# Patient Record
Sex: FEMALE | Race: WHITE | NOT HISPANIC OR LATINO | Employment: OTHER | URBAN - METROPOLITAN AREA
[De-identification: names, ages, dates, MRNs, and addresses within clinical notes are randomized per-mention and may not be internally consistent; named-entity substitution may affect disease eponyms.]

---

## 2023-02-13 ENCOUNTER — TELEPHONE (OUTPATIENT)
Dept: PRIMARY CARE CLINIC | Facility: CLINIC | Age: 78
End: 2023-02-13
Payer: MEDICARE

## 2023-02-13 NOTE — TELEPHONE ENCOUNTER
Pt was contacted concerning her appt on 2/24/2023 w/Dr Adair; appt cancelled due to change in Dr Adair's schedule.    Left message for pt to return call to this office.

## 2023-02-24 ENCOUNTER — OFFICE VISIT (OUTPATIENT)
Dept: PRIMARY CARE CLINIC | Facility: CLINIC | Age: 78
End: 2023-02-24
Payer: MEDICARE

## 2023-02-24 VITALS
TEMPERATURE: 98 F | SYSTOLIC BLOOD PRESSURE: 136 MMHG | WEIGHT: 197.31 LBS | HEIGHT: 61 IN | BODY MASS INDEX: 37.25 KG/M2 | DIASTOLIC BLOOD PRESSURE: 82 MMHG | HEART RATE: 72 BPM | OXYGEN SATURATION: 96 % | RESPIRATION RATE: 18 BRPM

## 2023-02-24 DIAGNOSIS — E78.5 HYPERLIPIDEMIA, UNSPECIFIED HYPERLIPIDEMIA TYPE: ICD-10-CM

## 2023-02-24 DIAGNOSIS — R73.03 PRE-DIABETES: ICD-10-CM

## 2023-02-24 DIAGNOSIS — I10 HYPERTENSION, UNSPECIFIED TYPE: ICD-10-CM

## 2023-02-24 DIAGNOSIS — E03.9 HYPOTHYROIDISM, UNSPECIFIED TYPE: Primary | ICD-10-CM

## 2023-02-24 PROCEDURE — 99999 PR PBB SHADOW E&M-EST. PATIENT-LVL IV: CPT | Mod: PBBFAC,,, | Performed by: FAMILY MEDICINE

## 2023-02-24 PROCEDURE — 3075F PR MOST RECENT SYSTOLIC BLOOD PRESS GE 130-139MM HG: ICD-10-PCS | Mod: CPTII,S$GLB,, | Performed by: FAMILY MEDICINE

## 2023-02-24 PROCEDURE — 99214 OFFICE O/P EST MOD 30 MIN: CPT | Mod: S$GLB,,, | Performed by: FAMILY MEDICINE

## 2023-02-24 PROCEDURE — 1126F AMNT PAIN NOTED NONE PRSNT: CPT | Mod: CPTII,S$GLB,, | Performed by: FAMILY MEDICINE

## 2023-02-24 PROCEDURE — 3079F PR MOST RECENT DIASTOLIC BLOOD PRESSURE 80-89 MM HG: ICD-10-PCS | Mod: CPTII,S$GLB,, | Performed by: FAMILY MEDICINE

## 2023-02-24 PROCEDURE — 3075F SYST BP GE 130 - 139MM HG: CPT | Mod: CPTII,S$GLB,, | Performed by: FAMILY MEDICINE

## 2023-02-24 PROCEDURE — 3079F DIAST BP 80-89 MM HG: CPT | Mod: CPTII,S$GLB,, | Performed by: FAMILY MEDICINE

## 2023-02-24 PROCEDURE — 99999 PR PBB SHADOW E&M-EST. PATIENT-LVL IV: ICD-10-PCS | Mod: PBBFAC,,, | Performed by: FAMILY MEDICINE

## 2023-02-24 PROCEDURE — 1159F PR MEDICATION LIST DOCUMENTED IN MEDICAL RECORD: ICD-10-PCS | Mod: CPTII,S$GLB,, | Performed by: FAMILY MEDICINE

## 2023-02-24 PROCEDURE — 1126F PR PAIN SEVERITY QUANTIFIED, NO PAIN PRESENT: ICD-10-PCS | Mod: CPTII,S$GLB,, | Performed by: FAMILY MEDICINE

## 2023-02-24 PROCEDURE — 99214 PR OFFICE/OUTPT VISIT, EST, LEVL IV, 30-39 MIN: ICD-10-PCS | Mod: S$GLB,,, | Performed by: FAMILY MEDICINE

## 2023-02-24 PROCEDURE — 1159F MED LIST DOCD IN RCRD: CPT | Mod: CPTII,S$GLB,, | Performed by: FAMILY MEDICINE

## 2023-02-24 RX ORDER — ROSUVASTATIN CALCIUM 10 MG/1
10 TABLET, COATED ORAL NIGHTLY
COMMUNITY
Start: 2022-12-02 | End: 2023-08-24 | Stop reason: SDUPTHER

## 2023-02-24 RX ORDER — METFORMIN HYDROCHLORIDE 500 MG/1
1000 TABLET, EXTENDED RELEASE ORAL 2 TIMES DAILY
COMMUNITY
Start: 2023-01-05 | End: 2023-08-24 | Stop reason: SDUPTHER

## 2023-02-24 RX ORDER — LEVOTHYROXINE SODIUM 125 UG/1
125 TABLET ORAL EVERY MORNING
COMMUNITY
End: 2023-02-24

## 2023-02-24 RX ORDER — ASPIRIN 81 MG/1
81 TABLET ORAL
COMMUNITY

## 2023-02-24 RX ORDER — AA/PROT/LYSINE/METHIO/VIT C/B6 50-12.5 MG
10 TABLET ORAL NIGHTLY
COMMUNITY
End: 2023-02-24 | Stop reason: SDUPTHER

## 2023-02-24 RX ORDER — ERGOCALCIFEROL 1.25 MG/1
50000 CAPSULE ORAL
COMMUNITY

## 2023-02-24 RX ORDER — NITROGLYCERIN 0.4 MG/1
TABLET SUBLINGUAL
COMMUNITY

## 2023-02-24 RX ORDER — LEVOCETIRIZINE DIHYDROCHLORIDE 5 MG/1
5 TABLET, FILM COATED ORAL NIGHTLY
COMMUNITY

## 2023-02-24 RX ORDER — FENOFIBRATE 160 MG/1
160 TABLET ORAL DAILY
COMMUNITY
Start: 2022-12-02 | End: 2023-08-24 | Stop reason: SDUPTHER

## 2023-02-24 RX ORDER — LEVOTHYROXINE SODIUM 125 UG/1
125 TABLET ORAL
Qty: 90 TABLET | Refills: 2 | Status: SHIPPED | OUTPATIENT
Start: 2023-02-24 | End: 2023-08-24

## 2023-02-24 NOTE — PROGRESS NOTES
"        [facility]       [address] [place] [address]      [phone]     Linette Adair MD         .      Office Visit  02/24/2023      Subjective      HPI:  Thelma Gonzalez is a 77 y.o. female presents today in clinic for "Follow-up  ."     Patient is here from [facility]- no complaints but she does complain of steady weight gain but admits she doesn't eat well and hasnt been able to exercise due to foot injury complicated by knee strain. She is doing better today. Started taking metformin since last visit.     REVIEW OF SYMPTOMS  General: weight gain ( 10 lbs)  Psychological ROS: negative ..  Endocrine ROS: difficulty losing weight  Ophthalmic ROS: negative   ENT ROS: negative .  Cardiovascular ROS: negative   Breast ROS: negative   Respiratory ROS: negative   Gastrointestinal ROS: negative   Genito-Urinary ROS: negative   Musculoskeletal ROS: negative   Dermatological ROS: negative  Neurological ROS: negative  Hematological and Lymphatic ROS: denies             The following were updated and reviewed by myself in the chart: medications, past medical history, past surgical history, family history, social history, and allergies.     MEDICATIONS    Current Outpatient Medications:     aspirin (ECOTRIN) 81 MG EC tablet, Take 81 mg by mouth., Disp: , Rfl:     ergocalciferol (ERGOCALCIFEROL) 50,000 unit Cap, Take 50,000 Units by mouth every Mon, Wed, Fri., Disp: , Rfl:     fenofibrate 160 MG Tab, Take 160 mg by mouth once daily., Disp: , Rfl:     levocetirizine (XYZAL) 5 MG tablet, Take 5 mg by mouth every evening., Disp: , Rfl:     metFORMIN (GLUCOPHAGE-XR) 500 MG ER 24hr tablet, Take 1,000 mg by mouth 2 (two) times a day., Disp: , Rfl:     nitroGLYCERIN (NITROSTAT) 0.4 MG SL tablet, nitroglycerin 0.4 mg sublingual tablet, Disp: , Rfl:     NON FORMULARY MEDICATION, kRILL oIL 500MG MORNING Tumeric 1000 mg morning, Disp: , Rfl:     rosuvastatin " "(CRESTOR) 10 MG tablet, Take 10 mg by mouth every evening., Disp: , Rfl:     levothyroxine (SYNTHROID) 125 MCG tablet, Take 1 tablet (125 mcg total) by mouth before breakfast., Disp: 90 tablet, Rfl: 2    ALLERGIES  Review of patient's allergies indicates:   Allergen Reactions    Ciprofloxacin Hives    Macrolide antibiotics Hives    Prednisone Hives          /82   Pulse 72   Temp 97.9 °F (36.6 °C)   Resp 18   Ht 5' 1" (1.549 m)   Wt 89.5 kg (197 lb 5 oz)   SpO2 96%   BMI 37.28 kg/m²   Wt Readings from Last 3 Encounters:   02/24/23 89.5 kg (197 lb 5 oz)     BP Readings from Last 3 Encounters:   02/24/23 136/82          PHYSICAL EXAM:     Physical Exam:    Constitutional:   General Appearance:  healthy and alert  Body Habitus: obese  Level of Distress: NAD   Ambulation: ambulates independently  Psychiatric:   Appearance: well dressed, appropriate  Speech normal tone, pitch and volume  Cognition: memory normal   Orientation: orientated to time, place and person   Mood: normal  Movement: normal and sitting calmly  Head: normocephalic and atraumatic  and puffy facies  Eyes:    Eye Inspection: grossly normal , EOMI  Pupils: PERRLA  Conjunctivae: clear  Lids: normal eyelids  ENMT:     Neck:   Neck: normal, supple with no adenopathy or mass, carotids normal upstroke, no bruits, and increased neck circumference  Thyroid: normal thyroid appearance and examination  Lungs: Respiratory exam normal and unlabored;   Cardiovascular: Cardiovascular exam normal with RRR  Abdomen: soft and non-distended with no tenderness and increased waist circumference   Musculoskeletal: Musculoskeletal exam is normal  for strength, tone and ROM  Extremities: Extremities are normal on exam  Neurologic: Neurological exam is normal and grossly intact  Skin:   Inspection and palpation: Skin exam is normal  Nails: Nails are normal  Hair Texture: thin       ASSESSMENT AND PLAN      1. Hypothyroidism, unspecified type  Patient advised to " comply with thyroid medications as directed. Patient should take thyroid meds on empty stomach and avoid taking with iron, calcium, zinc and fiber.  Potential risks associated with suppressing TSH (increased arrhythmia and bone loss) can occur as a result of thyroid replacement therapy. Emphasis is  on improving patient symptoms. Patient should notify us if any symptoms occur suggestive of overactive thyroid (fast heart rate, anxiety, sleeplessness, and tremors). Routine follow up recommended TSH is suboptimal and patient has had more weight gain.  We will try to increase to 125 to see if this improves symptoms.  We will repeat labs in 6 months  -     levothyroxine (SYNTHROID) 125 MCG tablet; Take 1 tablet (125 mcg total) by mouth before breakfast.  Dispense: 90 tablet; Refill: 2    2. Pre-diabetes  Pathophysiology of insulin resistance discussed with patient and therapeutic options were explained.  Dietary and lifestyle changes are recommended for the treatment of insulin resistance.   Low carbohydrate diet and/or possible intermittent fasting is recommended for insulin resistance and/or prediabetes.  Non FDA approved treatment options include but is not limited to GLP's, SGLT-2's, biguanides, and other glucose support supplements. Patient will notify us with any concerns or complaints regarding treatment plan.Weight loss is strongly encouraged and is emphasized to help reduce risk of diabetes.  Now taking metformin and tolerating well.  Okay to try to increase as tolerated    3. Hyperlipidemia, unspecified hyperlipidemia type  Reviewed importance of advanced lipid profiles. Advise daily exercise. Diet is recommended. Patients are encouraged to obtain healthy BMI weight. Risks associated with high cholesterol are well established and include but are not limited to heart disease, stroke and peripheral vascular disease. Patient should not smoke. Goal LDL particle number is <1000 and ApoB <70. Basic LDL is below 100  or below 70 if diabetic. Some non-FDA approved dietary supplements that may be beneficial to patient include but is not limited to high fiber diet at least 30g daily; niacin ER non flush free variety 500mg-1,000mg; Omega-3 fish oil DHA+EPA = 1,000mg twice daily; baby dose aspirin 81mg; co-enzyme q10 500mg to help reduce statin-induced myalgia; red rice yeast 1200mg twice daily; berberine 1000mg-2000mg daily. Patient is encouraged to exercise routinely and adhere to heart healthy diet with Mediterranean diet showing the most consistent data to help with lipid management.  On statin and controlled.  Sees cardiologist routinely.    4. Hypertension, unspecified type  Patient understands pathophysiology of high blood pressure. Patient should take meds as directed. Patient is asked to monitor blood pressure at home at random and send in BP diary if changes are made to treatment plan so adjustments can be made, if necessary, within 1-2 weeks of stopping, starting or changing medications. Patient will notify us if any symptoms occur including but not limited to dizziness, faint feeling, headache, blurred vision or chest pain or go to nearest ER if necessary for evaluation.  Blood pressure is stable on current regimen.         FOLLOW-UP:  No follow-ups on file.    I spent a total of 38 minutes face to face and non-face to face on the date of this visit.This includes time preparing to see the patient (eg, review of tests, notes), obtaining and/or reviewing additional history from an independent historian and/or outside medical records, documenting clinical information in the electronic health record, independently interpreting results and/or communicating results to the patient/family/caregiver, or care coordinator.    Signed by:  Linette Adair MD

## 2023-03-03 ENCOUNTER — PATIENT MESSAGE (OUTPATIENT)
Dept: PRIMARY CARE CLINIC | Facility: CLINIC | Age: 78
End: 2023-03-03
Payer: MEDICARE

## 2023-03-06 ENCOUNTER — PATIENT MESSAGE (OUTPATIENT)
Dept: PRIMARY CARE CLINIC | Facility: CLINIC | Age: 78
End: 2023-03-06
Payer: MEDICARE

## 2023-03-09 ENCOUNTER — PATIENT MESSAGE (OUTPATIENT)
Dept: PRIMARY CARE CLINIC | Facility: CLINIC | Age: 78
End: 2023-03-09
Payer: MEDICARE

## 2023-03-14 ENCOUNTER — PATIENT MESSAGE (OUTPATIENT)
Dept: PRIMARY CARE CLINIC | Facility: CLINIC | Age: 78
End: 2023-03-14
Payer: MEDICARE

## 2023-03-14 DIAGNOSIS — R73.03 PRE-DIABETES: Primary | ICD-10-CM

## 2023-03-14 RX ORDER — SEMAGLUTIDE 1.34 MG/ML
0.5 INJECTION, SOLUTION SUBCUTANEOUS
Qty: 4.5 ML | Refills: 1 | Status: SHIPPED | OUTPATIENT
Start: 2023-03-14 | End: 2023-06-29 | Stop reason: SDUPTHER

## 2023-03-15 ENCOUNTER — PATIENT MESSAGE (OUTPATIENT)
Dept: PRIMARY CARE CLINIC | Facility: CLINIC | Age: 78
End: 2023-03-15
Payer: MEDICARE

## 2023-03-16 NOTE — PROGRESS NOTES
Patient, Thelma Gonzalez (MRN #[id number]), presented with a recorded BMI of 37.28 kg/m^2 and a documented comorbidity(s):  - Hypertension  - Hyperlipidemia  to which the severe obesity is a contributing factor. This is consistent with the definition of severe obesity (BMI 35.0-39.9) with comorbidity (ICD-10 E66.01, Z68.35). The patient's severe obesity was monitored, evaluated, addressed and/or treated. This addendum to the medical record is made on 03/16/2023.

## 2023-03-24 ENCOUNTER — PATIENT MESSAGE (OUTPATIENT)
Dept: PRIMARY CARE CLINIC | Facility: CLINIC | Age: 78
End: 2023-03-24
Payer: MEDICARE

## 2023-03-30 ENCOUNTER — PATIENT MESSAGE (OUTPATIENT)
Dept: PRIMARY CARE CLINIC | Facility: CLINIC | Age: 78
End: 2023-03-30
Payer: MEDICARE

## 2023-06-29 DIAGNOSIS — E78.5 HYPERLIPIDEMIA, UNSPECIFIED HYPERLIPIDEMIA TYPE: ICD-10-CM

## 2023-06-29 DIAGNOSIS — R73.03 PRE-DIABETES: ICD-10-CM

## 2023-06-29 DIAGNOSIS — E03.9 HYPOTHYROIDISM, UNSPECIFIED TYPE: Primary | ICD-10-CM

## 2023-06-29 DIAGNOSIS — I10 HYPERTENSION, UNSPECIFIED TYPE: ICD-10-CM

## 2023-06-29 RX ORDER — SEMAGLUTIDE 1.34 MG/ML
0.5 INJECTION, SOLUTION SUBCUTANEOUS
Qty: 4.5 ML | Refills: 1 | Status: SHIPPED | OUTPATIENT
Start: 2023-06-29 | End: 2023-08-24

## 2023-08-17 LAB
ERYTHROCYTE [DISTWIDTH] IN BLOOD BY AUTOMATED COUNT: 12.5 % (ref 11–15)
HCT VFR BLD AUTO: 41.3 % (ref 35–45)
HGB BLD-MCNC: 13.5 G/DL (ref 11.7–15.5)
INSULIN SERPL-ACNC: 34.4 UIU/ML
MCH RBC QN AUTO: 30.1 PG (ref 27–33)
MCHC RBC AUTO-ENTMCNC: 32.7 G/DL (ref 32–36)
MCV RBC AUTO: 92 FL (ref 80–100)
PLATELET # BLD AUTO: 274 THOUSAND/UL (ref 140–400)
PMV BLD REES-ECKER: 9.7 FL (ref 7.5–12.5)
RBC # BLD AUTO: 4.49 MILLION/UL (ref 3.8–5.1)
T3FREE SERPL-MCNC: 3.2 PG/ML (ref 2.3–4.2)
WBC # BLD AUTO: 7.1 THOUSAND/UL (ref 3.8–10.8)

## 2023-08-18 LAB
ALBUMIN SERPL-MCNC: 4.7 G/DL (ref 3.6–5.1)
ALBUMIN/GLOB SERPL: 2.1 (CALC) (ref 1–2.5)
ALP SERPL-CCNC: 48 U/L (ref 37–153)
ALT SERPL-CCNC: 25 U/L (ref 6–29)
AST SERPL-CCNC: 23 U/L (ref 10–35)
BILIRUB SERPL-MCNC: 0.6 MG/DL (ref 0.2–1.2)
BUN SERPL-MCNC: 17 MG/DL (ref 7–25)
BUN/CREAT SERPL: NORMAL (CALC) (ref 6–22)
CALCIUM SERPL-MCNC: 10.1 MG/DL (ref 8.6–10.4)
CHLORIDE SERPL-SCNC: 103 MMOL/L (ref 98–110)
CHOLEST SERPL-MCNC: 115 MG/DL
CHOLEST/HDLC SERPL: 3.2 (CALC)
CO2 SERPL-SCNC: 29 MMOL/L (ref 20–32)
CREAT SERPL-MCNC: 0.72 MG/DL (ref 0.6–1)
EGFR: 86 ML/MIN/1.73M2
GLOBULIN SER CALC-MCNC: 2.2 G/DL (CALC) (ref 1.9–3.7)
GLUCOSE SERPL-MCNC: 87 MG/DL (ref 65–99)
HBA1C MFR BLD: 5.2 % OF TOTAL HGB
HDLC SERPL-MCNC: 36 MG/DL
LDLC SERPL CALC-MCNC: 53 MG/DL (CALC)
NONHDLC SERPL-MCNC: 79 MG/DL (CALC)
POTASSIUM SERPL-SCNC: 3.7 MMOL/L (ref 3.5–5.3)
PROT SERPL-MCNC: 6.9 G/DL (ref 6.1–8.1)
SODIUM SERPL-SCNC: 142 MMOL/L (ref 135–146)
T4 FREE SERPL-MCNC: 1.5 NG/DL (ref 0.8–1.8)
THYROPEROXIDASE AB SERPL-ACNC: 1 IU/ML
TRIGL SERPL-MCNC: 180 MG/DL
TSH SERPL-ACNC: 0.21 MIU/L (ref 0.4–4.5)

## 2023-08-24 ENCOUNTER — OFFICE VISIT (OUTPATIENT)
Dept: PRIMARY CARE CLINIC | Facility: CLINIC | Age: 78
End: 2023-08-24
Payer: MEDICARE

## 2023-08-24 VITALS
BODY MASS INDEX: 33.51 KG/M2 | TEMPERATURE: 97 F | SYSTOLIC BLOOD PRESSURE: 138 MMHG | WEIGHT: 177.5 LBS | HEART RATE: 74 BPM | HEIGHT: 61 IN | DIASTOLIC BLOOD PRESSURE: 80 MMHG

## 2023-08-24 DIAGNOSIS — R73.03 PRE-DIABETES: Primary | ICD-10-CM

## 2023-08-24 DIAGNOSIS — E78.5 HYPERLIPIDEMIA, UNSPECIFIED HYPERLIPIDEMIA TYPE: ICD-10-CM

## 2023-08-24 DIAGNOSIS — E03.9 HYPOTHYROIDISM, UNSPECIFIED TYPE: ICD-10-CM

## 2023-08-24 DIAGNOSIS — I10 HYPERTENSION, UNSPECIFIED TYPE: ICD-10-CM

## 2023-08-24 PROCEDURE — 3075F SYST BP GE 130 - 139MM HG: CPT | Mod: CPTII,S$GLB,, | Performed by: FAMILY MEDICINE

## 2023-08-24 PROCEDURE — 99215 OFFICE O/P EST HI 40 MIN: CPT | Mod: S$GLB,,, | Performed by: FAMILY MEDICINE

## 2023-08-24 PROCEDURE — 3079F DIAST BP 80-89 MM HG: CPT | Mod: CPTII,S$GLB,, | Performed by: FAMILY MEDICINE

## 2023-08-24 PROCEDURE — 1126F PR PAIN SEVERITY QUANTIFIED, NO PAIN PRESENT: ICD-10-PCS | Mod: CPTII,S$GLB,, | Performed by: FAMILY MEDICINE

## 2023-08-24 PROCEDURE — 3075F PR MOST RECENT SYSTOLIC BLOOD PRESS GE 130-139MM HG: ICD-10-PCS | Mod: CPTII,S$GLB,, | Performed by: FAMILY MEDICINE

## 2023-08-24 PROCEDURE — 3288F PR FALLS RISK ASSESSMENT DOCUMENTED: ICD-10-PCS | Mod: CPTII,S$GLB,, | Performed by: FAMILY MEDICINE

## 2023-08-24 PROCEDURE — 99999 PR PBB SHADOW E&M-EST. PATIENT-LVL III: CPT | Mod: PBBFAC,,, | Performed by: FAMILY MEDICINE

## 2023-08-24 PROCEDURE — 1126F AMNT PAIN NOTED NONE PRSNT: CPT | Mod: CPTII,S$GLB,, | Performed by: FAMILY MEDICINE

## 2023-08-24 PROCEDURE — 99215 PR OFFICE/OUTPT VISIT, EST, LEVL V, 40-54 MIN: ICD-10-PCS | Mod: S$GLB,,, | Performed by: FAMILY MEDICINE

## 2023-08-24 PROCEDURE — 1101F PR PT FALLS ASSESS DOC 0-1 FALLS W/OUT INJ PAST YR: ICD-10-PCS | Mod: CPTII,S$GLB,, | Performed by: FAMILY MEDICINE

## 2023-08-24 PROCEDURE — 1159F MED LIST DOCD IN RCRD: CPT | Mod: CPTII,S$GLB,, | Performed by: FAMILY MEDICINE

## 2023-08-24 PROCEDURE — 3288F FALL RISK ASSESSMENT DOCD: CPT | Mod: CPTII,S$GLB,, | Performed by: FAMILY MEDICINE

## 2023-08-24 PROCEDURE — 3079F PR MOST RECENT DIASTOLIC BLOOD PRESSURE 80-89 MM HG: ICD-10-PCS | Mod: CPTII,S$GLB,, | Performed by: FAMILY MEDICINE

## 2023-08-24 PROCEDURE — 99999 PR PBB SHADOW E&M-EST. PATIENT-LVL III: ICD-10-PCS | Mod: PBBFAC,,, | Performed by: FAMILY MEDICINE

## 2023-08-24 PROCEDURE — 1101F PT FALLS ASSESS-DOCD LE1/YR: CPT | Mod: CPTII,S$GLB,, | Performed by: FAMILY MEDICINE

## 2023-08-24 PROCEDURE — 1159F PR MEDICATION LIST DOCUMENTED IN MEDICAL RECORD: ICD-10-PCS | Mod: CPTII,S$GLB,, | Performed by: FAMILY MEDICINE

## 2023-08-24 RX ORDER — ROSUVASTATIN CALCIUM 10 MG/1
10 TABLET, COATED ORAL NIGHTLY
Qty: 90 TABLET | Refills: 2 | Status: SHIPPED | OUTPATIENT
Start: 2023-08-24 | End: 2024-04-02 | Stop reason: SDUPTHER

## 2023-08-24 RX ORDER — METFORMIN HYDROCHLORIDE 500 MG/1
1000 TABLET, EXTENDED RELEASE ORAL 2 TIMES DAILY
Qty: 360 TABLET | Refills: 2 | Status: SHIPPED | OUTPATIENT
Start: 2023-08-24 | End: 2024-04-02 | Stop reason: SDUPTHER

## 2023-08-24 RX ORDER — FENOFIBRATE 160 MG/1
160 TABLET ORAL DAILY
Qty: 90 TABLET | Refills: 2 | Status: SHIPPED | OUTPATIENT
Start: 2023-08-24 | End: 2024-04-02 | Stop reason: SDUPTHER

## 2023-08-24 RX ORDER — FUROSEMIDE 20 MG/1
20 TABLET ORAL
COMMUNITY
Start: 2023-03-11

## 2023-08-24 RX ORDER — ICOSAPENT ETHYL 1000 MG/1
2 CAPSULE ORAL 2 TIMES DAILY
Qty: 360 CAPSULE | Refills: 2 | Status: SHIPPED | OUTPATIENT
Start: 2023-08-24 | End: 2024-04-02

## 2023-08-24 RX ORDER — SEMAGLUTIDE 1.34 MG/ML
1 INJECTION, SOLUTION SUBCUTANEOUS
Qty: 9 ML | Refills: 3 | Status: SHIPPED | OUTPATIENT
Start: 2023-08-24 | End: 2023-11-28

## 2023-08-24 RX ORDER — LEVOTHYROXINE SODIUM 112 UG/1
112 TABLET ORAL
Qty: 90 TABLET | Refills: 2 | Status: SHIPPED | OUTPATIENT
Start: 2023-08-24 | End: 2024-04-02 | Stop reason: SDUPTHER

## 2023-08-24 NOTE — ASSESSMENT & PLAN NOTE
Presumed with recent weight loss, TSH is now suppressed though she has no overactive symptoms.  But given her age and risk factors, we will go ahead and reduce Synthroid and then plan to repeat labs in six months or sooner if patient develops symptoms

## 2023-08-24 NOTE — ASSESSMENT & PLAN NOTE
Triglycerides are still elevated despite multiple medications for such and weight loss.  She is had levels as high as 1000 when she was seen [facility].  We will try adding prescription strength fish oil to see if this can help get her closer to the goal of 150 or less but regardless this maybe her normal given her history

## 2023-08-24 NOTE — PROGRESS NOTES
"    [facility]       [address] [place] [address]      [phone]     Linette Adair MD         .      Office Visit  08/24/2023  [id number]      SUBJECTIVE     HPI:  Thelma Gonzalez is a 78 y.o. female presents today in clinic for "Follow-up  ."   Patient is here for routine lab follow-up.  She is doing great since she started Ozempic and has been able to lose 20 lb.  She is staying active in her guard mainly but is limited to the amount of heat outside.  She feels great.  She is in the process of seeing a new cardiologist since hers retired.  She feels like her thyroid is good.  Sleep is good        ROS   Review of symptoms are negative except as noted.     PAST MEDICAL HISTORY     Past Medical History:   Diagnosis Date    Cancer     Hyperlipidemia     Hyperthyroidism        Past Surgical History:   Procedure Laterality Date    BREAST SURGERY      LUMPECTOMY, BREAST         Social History     Socioeconomic History    Marital status:    Tobacco Use    Smoking status: Never    Smokeless tobacco: Never       Allergies as of 08/24/2023 - Reviewed 08/24/2023   Allergen Reaction Noted    Ciprofloxacin Hives 02/24/2023    Nitrofurantoin monohyd/m-cryst Hives 06/26/2023    Prednisone Hives 02/24/2023    Macrolide antibiotics Hives 02/24/2023       HOME MEDS     Current Outpatient Medications   Medication Instructions    aspirin (ECOTRIN) 81 mg, Oral    ergocalciferol (ERGOCALCIFEROL) 50,000 Units, Oral, Every Mon, Wed, Fri    fenofibrate 160 mg, Oral, Daily    furosemide (LASIX) 20 mg, Oral    icosapent ethyL (VASCEPA) 2 g, Oral, 2 times daily    levocetirizine (XYZAL) 5 mg, Oral, Nightly    levothyroxine (SYNTHROID) 112 mcg, Oral, Before breakfast    metFORMIN (GLUCOPHAGE-XR) 1,000 mg, Oral, 2 times daily    nitroGLYCERIN (NITROSTAT) 0.4 MG SL tablet nitroglycerin 0.4 mg sublingual tablet    NON FORMULARY MEDICATION kRILL oIL 500MG " "MORNING<BR>Tumeric 1000 mg morning    OZEMPIC 1 mg, Subcutaneous, Every 7 days    rosuvastatin (CRESTOR) 10 mg, Oral, Nightly       The following were updated and reviewed by myself in the chart: medications, past medical history, past surgical history, family history, social history, and allergies.        Objective    OBJECTIVE   /80   Pulse 74   Temp 97.3 °F (36.3 °C)   Ht 5' 1" (1.549 m)   Wt 80.5 kg (177 lb 7.5 oz)   BMI 33.53 kg/m²     Wt Readings from Last 2 Encounters:   08/24/23 80.5 kg (177 lb 7.5 oz)   02/24/23 89.5 kg (197 lb 5 oz)       BP Readings from Last 2 Encounters:   08/24/23 138/80   02/24/23 136/82          Physical Exam  Vitals and nursing note reviewed.   Constitutional:       Appearance: Normal appearance. She is obese.   HENT:      Head: Normocephalic and atraumatic.      Nose: Nose normal.   Eyes:      Extraocular Movements: Extraocular movements intact.      Conjunctiva/sclera: Conjunctivae normal.      Pupils: Pupils are equal, round, and reactive to light.      Comments: Lateral eyebrow thinning   Cardiovascular:      Rate and Rhythm: Normal rate and regular rhythm.   Pulmonary:      Effort: Pulmonary effort is normal.      Breath sounds: Normal breath sounds.   Abdominal:      General: Abdomen is flat.      Palpations: Abdomen is soft.      Tenderness: There is no abdominal tenderness.      Comments: Increased abdominal girth   Musculoskeletal:         General: No swelling or tenderness. Normal range of motion.      Cervical back: Normal range of motion.   Lymphadenopathy:      Cervical: No cervical adenopathy.   Skin:     General: Skin is warm.      Findings: No lesion or rash.      Comments: Thinning hair   Neurological:      General: No focal deficit present.      Mental Status: She is alert. Mental status is at baseline.   Psychiatric:         Mood and Affect: Mood normal.         Behavior: Behavior normal.               LABS      LAB RESULTS, IF AVAILABLE, ARE DISCUSSED " WITH PATIENT AND POSTED TO PATIENT PORTAL     ASSESSMENT & PLAN     1. Pre-diabetes  Overview:  Pathophysiology of insulin resistance discussed with patient and therapeutic options were explained.  Dietary and lifestyle changes are recommended for the treatment of insulin resistance.   Low carbohydrate diet and/or possible intermittent fasting is recommended for insulin resistance and/or prediabetes.  Non FDA approved treatment options include but is not limited to GLP's, SGLT-2's, biguanides, and other glucose support supplements. Patient will notify us with any concerns or complaints regarding treatment plan.Weight loss is strongly encouraged and is emphasized to help reduce risk of diabetes.      Assessment & Plan:  Patient is only taking one metformin along with Ozempic and finds this to be the best combination.  She is doing well so we will continue    Orders:  -     semaglutide (OZEMPIC) 1 mg/dose (4 mg/3 mL); Inject 1 mg into the skin every 7 days.  Dispense: 9 mL; Refill: 3  -     metFORMIN (GLUCOPHAGE-XR) 500 MG ER 24hr tablet; Take 2 tablets (1,000 mg total) by mouth 2 (two) times a day.  Dispense: 360 tablet; Refill: 2    2. Hypothyroidism, unspecified type  Overview:  Patient advised to comply with thyroid medications as directed. Patient should take thyroid meds on empty stomach and avoid taking with iron, calcium, zinc and fiber.  Potential risks associated with suppressing TSH (increased arrhythmia and bone loss) can occur as a result of thyroid replacement therapy. Emphasis is  on improving patient symptoms. Patient should notify us if any symptoms occur suggestive of overactive thyroid (fast heart rate, anxiety, sleeplessness, and tremors). Routine follow up recommended       Assessment & Plan:  Presumed with recent weight loss, TSH is now suppressed though she has no overactive symptoms.  But given her age and risk factors, we will go ahead and reduce Synthroid and then plan to repeat labs in six  months or sooner if patient develops symptoms    Orders:  -     levothyroxine (SYNTHROID) 112 MCG tablet; Take 1 tablet (112 mcg total) by mouth before breakfast.  Dispense: 90 tablet; Refill: 2    3. Hyperlipidemia, unspecified hyperlipidemia type  Overview:  Reviewed importance of advanced lipid profiles. Advise daily exercise. Diet is recommended. Patients are encouraged to obtain healthy BMI weight. Risks associated with high cholesterol are well established and include but are not limited to heart disease, stroke and peripheral vascular disease. Patient should not smoke. Goal LDL particle number is <1000 and ApoB <70. Basic LDL is below 100 or below 70 if diabetic. Some non-FDA approved dietary supplements that may be beneficial to patient include but is not limited to high fiber diet at least 30g daily; niacin ER non flush free variety 500mg-1,000mg; Omega-3 fish oil DHA+EPA = 1,000mg twice daily; baby dose aspirin 81mg; co-enzyme q10 500mg to help reduce statin-induced myalgia; red rice yeast 1200mg twice daily; berberine 1000mg-2000mg daily. Patient is encouraged to exercise routinely and adhere to heart healthy diet with Mediterranean diet showing the most consistent data to help with lipid management.      Assessment & Plan:  Triglycerides are still elevated despite multiple medications for such and weight loss.  She is had levels as high as 1000 when she was seen by Dr. Shukla at the metabolic center.  We will try adding prescription strength fish oil to see if this can help get her closer to the goal of 150 or less but regardless this maybe her normal given her history    Orders:  -     icosapent ethyL (VASCEPA) 1 gram Cap; Take 2 capsules (2 g total) by mouth 2 (two) times a day.  Dispense: 360 capsule; Refill: 2  -     fenofibrate 160 MG Tab; Take 1 tablet (160 mg total) by mouth once daily.  Dispense: 90 tablet; Refill: 2  -     rosuvastatin (CRESTOR) 10 MG tablet; Take 1 tablet (10 mg total) by mouth  "every evening.  Dispense: 90 tablet; Refill: 2    4. Hypertension, unspecified type  Overview:  Patient understands pathophysiology of high blood pressure. Patient should take meds as directed. Patient is asked to monitor blood pressure at home at random and send in BP diary if changes are made to treatment plan so adjustments can be made, if necessary, within 1-2 weeks of stopping, starting or changing medications. Patient will notify us if any symptoms occur including but not limited to dizziness, faint feeling, headache, blurred vision or chest pain or go to nearest ER if necessary for evaluation.      Assessment & Plan:  Blood pressure is stable.  No changes.            I spent a total of 40 minutes face to face and non-face to face on the date of this visit.This includes time preparing to see the patient (eg, review of tests, notes), obtaining and/or reviewing additional history from an independent historian and/or outside medical records, documenting clinical information in the electronic health record, independently interpreting results and/or communicating results to the patient/family/caregiver, or care coordinator.      Disclaimer: Portions of this record may have been created with voice recognition software. Occasional wrong-word or "sound-a-like" substitutions may have occurred due to inherent limitations of voice recognition software. Read the chart carefully and recognize, using context, where substitutions have occurred."    Signed by:  Linette Adair MD           "

## 2023-08-24 NOTE — ASSESSMENT & PLAN NOTE
Patient is only taking one metformin along with Ozempic and finds this to be the best combination.  She is doing well so we will continue

## 2023-09-05 ENCOUNTER — PATIENT MESSAGE (OUTPATIENT)
Dept: PRIMARY CARE CLINIC | Facility: CLINIC | Age: 78
End: 2023-09-05
Payer: MEDICARE

## 2023-10-11 DIAGNOSIS — E03.9 HYPOTHYROIDISM, UNSPECIFIED TYPE: ICD-10-CM

## 2023-10-11 RX ORDER — LEVOTHYROXINE SODIUM 125 UG/1
TABLET ORAL
Qty: 90 TABLET | Refills: 2 | OUTPATIENT
Start: 2023-10-11

## 2023-11-28 DIAGNOSIS — R73.03 PRE-DIABETES: Primary | ICD-10-CM

## 2023-11-28 RX ORDER — SEMAGLUTIDE 2.68 MG/ML
2 INJECTION, SOLUTION SUBCUTANEOUS
Qty: 9 ML | Refills: 3 | Status: SHIPPED | OUTPATIENT
Start: 2023-11-28 | End: 2024-04-02

## 2024-02-20 DIAGNOSIS — E03.9 HYPOTHYROIDISM, UNSPECIFIED TYPE: ICD-10-CM

## 2024-02-20 RX ORDER — LEVOTHYROXINE SODIUM 125 UG/1
TABLET ORAL
Qty: 90 TABLET | Refills: 2 | Status: SHIPPED | OUTPATIENT
Start: 2024-02-20 | End: 2024-04-02

## 2024-02-28 ENCOUNTER — TELEPHONE (OUTPATIENT)
Dept: PRIMARY CARE CLINIC | Facility: CLINIC | Age: 79
End: 2024-02-28
Payer: MEDICARE

## 2024-02-28 NOTE — TELEPHONE ENCOUNTER
----- Message from Pamela Sherwood sent at 2/28/2024  2:41 PM CST -----  Name of Who is Calling:pt           What is the request in detail:requesting a call to discuss the letter that was received about provider going to the [place] at the end of March. Patient would like to reschedule for the new location but [place] schedule is not available.            Can the clinic reply by [facility]:no           What Number to Call Back if not in [facility]: [phone]

## 2024-02-28 NOTE — TELEPHONE ENCOUNTER
Called pt and let vm, stating provider will be no longer in primary care but we will be reaching out soon to discuss appt in April with details once we get them.

## 2024-03-06 ENCOUNTER — PATIENT MESSAGE (OUTPATIENT)
Dept: PRIMARY CARE CLINIC | Facility: CLINIC | Age: 79
End: 2024-03-06
Payer: MEDICARE

## 2024-03-26 ENCOUNTER — TELEPHONE (OUTPATIENT)
Dept: PRIMARY CARE CLINIC | Facility: CLINIC | Age: 79
End: 2024-03-26
Payer: MEDICARE

## 2024-03-26 ENCOUNTER — PATIENT MESSAGE (OUTPATIENT)
Dept: PRIMARY CARE CLINIC | Facility: CLINIC | Age: 79
End: 2024-03-26
Payer: MEDICARE

## 2024-03-26 NOTE — TELEPHONE ENCOUNTER
----- Message from Chata Acosta sent at 3/26/2024  2:21 PM CDT -----  Contact: self  [phone]  Patient called in this afternoon stating she would like to reschedule her appointment for 04/02 that is scheduled for the [facility] to the [place]. Please call back  [phone]. Thanks tpw

## 2024-03-27 ENCOUNTER — TELEPHONE (OUTPATIENT)
Dept: PRIMARY CARE CLINIC | Facility: CLINIC | Age: 79
End: 2024-03-27
Payer: MEDICARE

## 2024-03-27 DIAGNOSIS — I10 HYPERTENSION, UNSPECIFIED TYPE: ICD-10-CM

## 2024-03-27 DIAGNOSIS — R73.03 PRE-DIABETES: Primary | ICD-10-CM

## 2024-03-27 DIAGNOSIS — E78.5 HYPERLIPIDEMIA, UNSPECIFIED HYPERLIPIDEMIA TYPE: ICD-10-CM

## 2024-03-27 DIAGNOSIS — E03.9 HYPOTHYROIDISM, UNSPECIFIED TYPE: ICD-10-CM

## 2024-03-27 NOTE — TELEPHONE ENCOUNTER
----- Message from Christopher Knox sent at 3/27/2024  9:47 AM CDT -----  Contact: Thelma Gaspar is calling in regards to getting lab orders sent to [facility], fax number [phone]. Please call back at [phone]                                    Thanks  KT

## 2024-04-02 ENCOUNTER — OFFICE VISIT (OUTPATIENT)
Dept: PRIMARY CARE CLINIC | Facility: CLINIC | Age: 79
End: 2024-04-02
Payer: MEDICARE

## 2024-04-02 VITALS
BODY MASS INDEX: 31.3 KG/M2 | OXYGEN SATURATION: 98 % | WEIGHT: 165.81 LBS | HEART RATE: 70 BPM | TEMPERATURE: 98 F | SYSTOLIC BLOOD PRESSURE: 138 MMHG | HEIGHT: 61 IN | DIASTOLIC BLOOD PRESSURE: 82 MMHG

## 2024-04-02 DIAGNOSIS — R73.03 PRE-DIABETES: ICD-10-CM

## 2024-04-02 DIAGNOSIS — C20 ADENOCARCINOMA OF RECTUM: ICD-10-CM

## 2024-04-02 DIAGNOSIS — E03.9 HYPOTHYROIDISM, UNSPECIFIED TYPE: Primary | ICD-10-CM

## 2024-04-02 DIAGNOSIS — Z85.048 HISTORY OF RECTAL CANCER: ICD-10-CM

## 2024-04-02 DIAGNOSIS — T45.1X5A NEUROPATHY DUE TO CHEMOTHERAPEUTIC DRUG: ICD-10-CM

## 2024-04-02 DIAGNOSIS — G62.0 NEUROPATHY DUE TO CHEMOTHERAPEUTIC DRUG: ICD-10-CM

## 2024-04-02 DIAGNOSIS — E78.5 HYPERLIPIDEMIA, UNSPECIFIED HYPERLIPIDEMIA TYPE: ICD-10-CM

## 2024-04-02 PROCEDURE — 99215 OFFICE O/P EST HI 40 MIN: CPT | Mod: S$GLB,,, | Performed by: FAMILY MEDICINE

## 2024-04-02 PROCEDURE — 1101F PT FALLS ASSESS-DOCD LE1/YR: CPT | Mod: CPTII,S$GLB,, | Performed by: FAMILY MEDICINE

## 2024-04-02 PROCEDURE — 3075F SYST BP GE 130 - 139MM HG: CPT | Mod: CPTII,S$GLB,, | Performed by: FAMILY MEDICINE

## 2024-04-02 PROCEDURE — 3288F FALL RISK ASSESSMENT DOCD: CPT | Mod: CPTII,S$GLB,, | Performed by: FAMILY MEDICINE

## 2024-04-02 PROCEDURE — 1159F MED LIST DOCD IN RCRD: CPT | Mod: CPTII,S$GLB,, | Performed by: FAMILY MEDICINE

## 2024-04-02 PROCEDURE — 99999 PR PBB SHADOW E&M-EST. PATIENT-LVL III: CPT | Mod: PBBFAC,,, | Performed by: FAMILY MEDICINE

## 2024-04-02 PROCEDURE — 3079F DIAST BP 80-89 MM HG: CPT | Mod: CPTII,S$GLB,, | Performed by: FAMILY MEDICINE

## 2024-04-02 RX ORDER — UBIQUINOL 100 MG
CAPSULE ORAL
COMMUNITY

## 2024-04-02 RX ORDER — ESOMEPRAZOLE MAGNESIUM 40 MG/1
40 CAPSULE, DELAYED RELEASE ORAL
COMMUNITY

## 2024-04-02 RX ORDER — MULTIVITAMIN
1 TABLET ORAL DAILY
COMMUNITY

## 2024-04-02 RX ORDER — FENOFIBRATE 160 MG/1
160 TABLET ORAL DAILY
Qty: 90 TABLET | Refills: 2 | Status: SHIPPED | OUTPATIENT
Start: 2024-04-02

## 2024-04-02 RX ORDER — METFORMIN HYDROCHLORIDE 500 MG/1
1000 TABLET, EXTENDED RELEASE ORAL 2 TIMES DAILY
Qty: 360 TABLET | Refills: 2 | Status: SHIPPED | OUTPATIENT
Start: 2024-04-02

## 2024-04-02 RX ORDER — CHOLECALCIFEROL (VITAMIN D3) 125 MCG
CAPSULE ORAL
COMMUNITY

## 2024-04-02 RX ORDER — LEVOTHYROXINE SODIUM 112 UG/1
112 TABLET ORAL
Qty: 90 TABLET | Refills: 2 | Status: SHIPPED | OUTPATIENT
Start: 2024-04-02

## 2024-04-02 RX ORDER — ROSUVASTATIN CALCIUM 10 MG/1
10 TABLET, COATED ORAL NIGHTLY
Qty: 90 TABLET | Refills: 2 | Status: SHIPPED | OUTPATIENT
Start: 2024-04-02

## 2024-04-02 NOTE — PROGRESS NOTES
"    [facility]       [address] [place] [address]      [phone]     Linette Adair MD         .      Office Visit  04/02/2024  [id number]      SUBJECTIVE     HPI:  Thelma Gonzalez is a 79 y.o. female presents today in clinic for "No chief complaint on file.  ."   Patient is here for routine follow-up.  Because there was a lack of communication with the lab in our clinic, patient was not able to get her labs done before this visit.  Within the last year since being on Ozempic, she is lost nearly 60 lb.  She overall feels much better.  Her insurance has told her they would no longer cover the Ozempic, so she is preparing to start tapering off with the hopes of maintaining her current weight.  She does try to stay active as possible and she tries to make good healthy meal choices.  Denies any overactive thyroid symptoms.  She is scheduled to see your cardiologist in the next month, and has been doing well with her statin medication.        ROS   Review of symptoms are negative except as noted.     PAST MEDICAL HISTORY     Past Medical History:   Diagnosis Date    Cancer     Hyperlipidemia     Hyperthyroidism        Past Surgical History:   Procedure Laterality Date    BREAST SURGERY      DEBRIDEMENT, FINGERNAIL OR TOENAIL      DENTAL SURGERY      LUMPECTOMY, BREAST         Social History     Socioeconomic History    Marital status:    Tobacco Use    Smoking status: Never    Smokeless tobacco: Never       Allergies as of 04/02/2024 - Reviewed 04/02/2024   Allergen Reaction Noted    Ciprofloxacin Hives 02/24/2023    Nitrofurantoin monohyd/m-cryst Hives 06/26/2023    Prednisone Hives 02/24/2023    Macrolide antibiotics Hives 02/24/2023       HOME MEDS     Current Outpatient Medications   Medication Instructions    aspirin (ECOTRIN) 81 mg, Oral    biotin 5,000 mcg TbDL Oral    coQ10, ubiquinol, 100 mg Cap Oral    ergocalciferol (ERGOCALCIFEROL) " "50,000 Units, Oral, Every Mon, Wed, Fri    esomeprazole (NEXIUM) 40 mg, Oral, Before breakfast    fenofibrate 160 mg, Oral, Daily    furosemide (LASIX) 20 mg, Oral    levocetirizine (XYZAL) 5 mg, Oral, Nightly    metFORMIN (GLUCOPHAGE-XR) 1,000 mg, Oral, 2 times daily    multivitamin (ONE DAILY MULTIVITAMIN) per tablet 1 tablet, Oral, Daily    nitroGLYCERIN (NITROSTAT) 0.4 MG SL tablet nitroglycerin 0.4 mg sublingual tablet    NON FORMULARY MEDICATION kRILL oIL 500MG MORNING<BR>Tumeric 1000 mg morning    rosuvastatin (CRESTOR) 10 mg, Oral, Nightly    SYNTHROID 112 mcg, Oral, Before breakfast    TURMERIC ORAL Oral       The following were updated and reviewed by myself in the chart: medications, past medical history, past surgical history, family history, social history, and allergies.        Objective    OBJECTIVE   /82   Pulse 70   Temp 98 °F (36.7 °C)   Ht 5' 1" (1.549 m)   Wt 75.2 kg (165 lb 12.6 oz)   SpO2 98%   BMI 31.32 kg/m²     Wt Readings from Last 2 Encounters:   04/02/24 75.2 kg (165 lb 12.6 oz)   08/24/23 80.5 kg (177 lb 7.5 oz)       BP Readings from Last 2 Encounters:   04/02/24 138/82   08/24/23 138/80          Physical Exam  Vitals and nursing note reviewed.   Constitutional:       Appearance: Normal appearance. She is obese.   HENT:      Head: Normocephalic and atraumatic.      Nose: Nose normal.   Eyes:      Extraocular Movements: Extraocular movements intact.      Conjunctiva/sclera: Conjunctivae normal.      Pupils: Pupils are equal, round, and reactive to light.      Comments: Lateral eyebrow thinning   Cardiovascular:      Rate and Rhythm: Normal rate and regular rhythm.   Pulmonary:      Effort: Pulmonary effort is normal.      Breath sounds: Normal breath sounds.   Abdominal:      General: Abdomen is flat.      Palpations: Abdomen is soft.      Tenderness: There is no abdominal tenderness.      Comments: Increased abdominal girth   Musculoskeletal:         General: No swelling or " tenderness. Normal range of motion.      Cervical back: Normal range of motion.   Lymphadenopathy:      Cervical: No cervical adenopathy.   Skin:     General: Skin is warm.      Findings: No lesion or rash.      Comments: Thinning hair   Neurological:      General: No focal deficit present.      Mental Status: She is alert. Mental status is at baseline.   Psychiatric:         Mood and Affect: Mood normal.         Behavior: Behavior normal.               LABS      LAB RESULTS, IF AVAILABLE, ARE DISCUSSED WITH PATIENT AND POSTED TO PATIENT PORTAL     ASSESSMENT & PLAN     1. Hypothyroidism, unspecified type  Overview:  Patient advised to comply with thyroid medications as directed. Patient should take thyroid meds on empty stomach and avoid taking with iron, calcium, zinc and fiber.  Potential risks associated with suppressing TSH (increased arrhythmia and bone loss) can occur as a result of thyroid replacement therapy. Emphasis is  on improving patient symptoms. Patient should notify us if any symptoms occur suggestive of overactive thyroid (fast heart rate, anxiety, sleeplessness, and tremors). Routine follow up recommended  Patient has been stable on her current thyroid medication, but we will plan to get labs with Dr. Dalal since she was unable to do so today     Orders:  -     SYNTHROID 112 mcg tablet; Take 1 tablet (112 mcg total) by mouth before breakfast.  Dispense: 90 tablet; Refill: 2    2. History of rectal cancer  In remission, asymptomatic  3. Neuropathy due to chemotherapeutic drug  Controlled,  4. Hyperlipidemia, unspecified hyperlipidemia type  Overview:  Reviewed importance of advanced lipid profiles. Advise daily exercise. Diet is recommended. Patients are encouraged to obtain healthy BMI weight. Risks associated with high cholesterol are well established and include but are not limited to heart disease, stroke and peripheral vascular disease. Patient should not smoke. Goal LDL particle number is  <1000 and ApoB <70. Basic LDL is below 100 or below 70 if diabetic. Some non-FDA approved dietary supplements that may be beneficial to patient include but is not limited to high fiber diet at least 30g daily; niacin ER non flush free variety 500mg-1,000mg; Omega-3 fish oil DHA+EPA = 1,000mg twice daily; baby dose aspirin 81mg; co-enzyme q10 500mg to help reduce statin-induced myalgia; red rice yeast 1200mg twice daily; berberine 1000mg-2000mg daily. Patient is encouraged to exercise routinely and adhere to heart healthy diet with Mediterranean diet showing the most consistent data to help with lipid management.    On statin, we will be seeing Dr. Dalal  Orders:  -     fenofibrate 160 MG Tab; Take 1 tablet (160 mg total) by mouth once daily.  Dispense: 90 tablet; Refill: 2  -     rosuvastatin (CRESTOR) 10 MG tablet; Take 1 tablet (10 mg total) by mouth every evening.  Dispense: 90 tablet; Refill: 2    5. Pre-diabetes  Overview:  Pathophysiology of insulin resistance discussed with patient and therapeutic options were explained.  Dietary and lifestyle changes are recommended for the treatment of insulin resistance.   Low carbohydrate diet and/or possible intermittent fasting is recommended for insulin resistance and/or prediabetes.  Non FDA approved treatment options include but is not limited to GLP's, SGLT-2's, biguanides, and other glucose support supplements. Patient will notify us with any concerns or complaints regarding treatment plan.Weight loss is strongly encouraged and is emphasized to help reduce risk of diabetes.    Once patient completes GLP therapy, we will transition just taking metformin alone and we will continue to monitor  Orders:  -     metFORMIN (GLUCOPHAGE-XR) 500 MG ER 24hr tablet; Take 2 tablets (1,000 mg total) by mouth 2 (two) times a day.  Dispense: 360 tablet; Refill: 2          I spent a total of 40 minutes face to face and non-face to face on the date of this visit.This includes time  "preparing to see the patient (eg, review of tests, notes), obtaining and/or reviewing additional history from an independent historian and/or outside medical records, documenting clinical information in the electronic health record, independently interpreting results and/or communicating results to the patient/family/caregiver, or care coordinator.      Disclaimer: Portions of this record may have been created with voice recognition software. Occasional wrong-word or "sound-a-like" substitutions may have occurred due to inherent limitations of voice recognition software. Read the chart carefully and recognize, using context, where substitutions have occurred."    Signed by:  Linette Adair MD           "

## 2024-05-13 ENCOUNTER — TELEPHONE (OUTPATIENT)
Dept: FAMILY MEDICINE | Facility: CLINIC | Age: 79
End: 2024-05-13
Payer: MEDICARE

## 2024-05-13 DIAGNOSIS — E03.9 HYPOTHYROIDISM, UNSPECIFIED TYPE: Primary | ICD-10-CM

## 2024-05-13 NOTE — TELEPHONE ENCOUNTER
----- Message from Eulogio Lopez sent at 5/13/2024  9:04 AM CDT -----  Type:  Patient Returning Call    Who Called:pt  Who Left Message for Patient:  Does the patient know what this is regarding?:referral for DIABETES MANAGEMENT  Would the patient rather a call back or a response via [facility]? CALL  Best Call Back Number:  [phone]  Additional Information: pt states she would like a call back from office in regards to getting referral for diabetes management. Thank you

## 2024-07-01 ENCOUNTER — TELEPHONE (OUTPATIENT)
Dept: INTERNAL MEDICINE | Facility: CLINIC | Age: 79
End: 2024-07-01
Payer: MEDICARE

## 2024-07-01 NOTE — TELEPHONE ENCOUNTER
Patient is aware that Dr. Adair is no longer with [facility]. I explained to her that I do not have any information on where she is going or when Dr. Adair will have that information.

## 2024-07-01 NOTE — TELEPHONE ENCOUNTER
----- Message from Arsalan Garces sent at 7/1/2024  3:00 PM CDT -----  Contact: Thelma Gaspar is calling in regards to getting a call back to discuss appt availability due to lipid issues.  Please call back at [phone] or [phone]      Thanks

## 2024-07-31 ENCOUNTER — PATIENT MESSAGE (OUTPATIENT)
Dept: RESEARCH | Facility: HOSPITAL | Age: 79
End: 2024-07-31
Payer: MEDICARE

## 2024-09-16 DIAGNOSIS — E03.9 HYPOTHYROIDISM, UNSPECIFIED TYPE: Primary | ICD-10-CM

## 2024-09-16 DIAGNOSIS — R73.03 PRE-DIABETES: ICD-10-CM

## 2024-09-16 DIAGNOSIS — I10 HYPERTENSION, UNSPECIFIED TYPE: ICD-10-CM

## 2024-09-16 DIAGNOSIS — E78.5 HYPERLIPIDEMIA, UNSPECIFIED HYPERLIPIDEMIA TYPE: ICD-10-CM

## 2024-09-16 NOTE — PROGRESS NOTES
OK to schedule as a NP with her  in back to back appt's. Notes: hypothyroidism management,pre-diabetes, OK per SA    Schedule her pre-clinic fasting labs at [facility] with her , Db Gonzalez.

## 2024-10-16 ENCOUNTER — LAB VISIT (OUTPATIENT)
Dept: LAB | Facility: HOSPITAL | Age: 79
End: 2024-10-16
Attending: NURSE PRACTITIONER
Payer: MEDICARE

## 2024-10-16 DIAGNOSIS — R73.03 PRE-DIABETES: ICD-10-CM

## 2024-10-16 DIAGNOSIS — I10 HYPERTENSION, UNSPECIFIED TYPE: ICD-10-CM

## 2024-10-16 DIAGNOSIS — E78.5 HYPERLIPIDEMIA, UNSPECIFIED HYPERLIPIDEMIA TYPE: ICD-10-CM

## 2024-10-16 DIAGNOSIS — E03.9 HYPOTHYROIDISM, UNSPECIFIED TYPE: ICD-10-CM

## 2024-10-16 LAB
ALBUMIN SERPL BCP-MCNC: 4.4 G/DL (ref 3.5–5.2)
ALP SERPL-CCNC: 65 U/L (ref 55–135)
ALT SERPL W/O P-5'-P-CCNC: 27 U/L (ref 10–44)
ANION GAP SERPL CALC-SCNC: 11 MMOL/L (ref 8–16)
AST SERPL-CCNC: 31 U/L (ref 10–40)
BASOPHILS # BLD AUTO: 0.04 K/UL (ref 0–0.2)
BASOPHILS NFR BLD: 0.4 % (ref 0–1.9)
BILIRUB SERPL-MCNC: 0.4 MG/DL (ref 0.1–1)
BUN SERPL-MCNC: 23 MG/DL (ref 8–23)
CALCIUM SERPL-MCNC: 10.3 MG/DL (ref 8.7–10.5)
CHLORIDE SERPL-SCNC: 106 MMOL/L (ref 95–110)
CHOLEST SERPL-MCNC: 143 MG/DL (ref 120–199)
CHOLEST/HDLC SERPL: 4 {RATIO} (ref 2–5)
CO2 SERPL-SCNC: 23 MMOL/L (ref 23–29)
CREAT SERPL-MCNC: 0.8 MG/DL (ref 0.5–1.4)
DIFFERENTIAL METHOD BLD: ABNORMAL
EOSINOPHIL # BLD AUTO: 0.1 K/UL (ref 0–0.5)
EOSINOPHIL NFR BLD: 0.7 % (ref 0–8)
ERYTHROCYTE [DISTWIDTH] IN BLOOD BY AUTOMATED COUNT: 13 % (ref 11.5–14.5)
EST. GFR  (NO RACE VARIABLE): >60 ML/MIN/1.73 M^2
ESTIMATED AVG GLUCOSE: 97 MG/DL (ref 68–131)
GLUCOSE SERPL-MCNC: 92 MG/DL (ref 70–110)
HBA1C MFR BLD: 5 % (ref 4–5.6)
HCT VFR BLD AUTO: 44.7 % (ref 37–48.5)
HDLC SERPL-MCNC: 36 MG/DL (ref 40–75)
HDLC SERPL: 25.2 % (ref 20–50)
HGB BLD-MCNC: 14 G/DL (ref 12–16)
IMM GRANULOCYTES # BLD AUTO: 0.02 K/UL (ref 0–0.04)
IMM GRANULOCYTES NFR BLD AUTO: 0.2 % (ref 0–0.5)
INSULIN COLLECTION INTERVAL: ABNORMAL
INSULIN SERPL-ACNC: 26.8 UU/ML
LDLC SERPL CALC-MCNC: 62.8 MG/DL (ref 63–159)
LYMPHOCYTES # BLD AUTO: 2.9 K/UL (ref 1–4.8)
LYMPHOCYTES NFR BLD: 31.8 % (ref 18–48)
MCH RBC QN AUTO: 30.1 PG (ref 27–31)
MCHC RBC AUTO-ENTMCNC: 31.3 G/DL (ref 32–36)
MCV RBC AUTO: 96 FL (ref 82–98)
MONOCYTES # BLD AUTO: 0.6 K/UL (ref 0.3–1)
MONOCYTES NFR BLD: 6.9 % (ref 4–15)
NEUTROPHILS # BLD AUTO: 5.5 K/UL (ref 1.8–7.7)
NEUTROPHILS NFR BLD: 60 % (ref 38–73)
NONHDLC SERPL-MCNC: 107 MG/DL
NRBC BLD-RTO: 0 /100 WBC
PLATELET # BLD AUTO: 284 K/UL (ref 150–450)
PMV BLD AUTO: 9.8 FL (ref 9.2–12.9)
POTASSIUM SERPL-SCNC: 4.1 MMOL/L (ref 3.5–5.1)
PROT SERPL-MCNC: 7.4 G/DL (ref 6–8.4)
RBC # BLD AUTO: 4.65 M/UL (ref 4–5.4)
SODIUM SERPL-SCNC: 140 MMOL/L (ref 136–145)
T4 FREE SERPL-MCNC: 1.07 NG/DL (ref 0.71–1.51)
TRIGL SERPL-MCNC: 221 MG/DL (ref 30–150)
TSH SERPL DL<=0.005 MIU/L-ACNC: 1.14 UIU/ML (ref 0.4–4)
WBC # BLD AUTO: 9.22 K/UL (ref 3.9–12.7)

## 2024-10-16 PROCEDURE — 80061 LIPID PANEL: CPT | Mod: HCNC | Performed by: NURSE PRACTITIONER

## 2024-10-16 PROCEDURE — 80053 COMPREHEN METABOLIC PANEL: CPT | Mod: HCNC | Performed by: NURSE PRACTITIONER

## 2024-10-16 PROCEDURE — 83525 ASSAY OF INSULIN: CPT | Mod: HCNC | Performed by: NURSE PRACTITIONER

## 2024-10-16 PROCEDURE — 84443 ASSAY THYROID STIM HORMONE: CPT | Mod: HCNC | Performed by: NURSE PRACTITIONER

## 2024-10-16 PROCEDURE — 85025 COMPLETE CBC W/AUTO DIFF WBC: CPT | Mod: HCNC | Performed by: NURSE PRACTITIONER

## 2024-10-16 PROCEDURE — 84439 ASSAY OF FREE THYROXINE: CPT | Mod: HCNC | Performed by: NURSE PRACTITIONER

## 2024-10-16 PROCEDURE — 83036 HEMOGLOBIN GLYCOSYLATED A1C: CPT | Mod: HCNC | Performed by: NURSE PRACTITIONER

## 2024-10-17 NOTE — PROGRESS NOTES
We will discuss at upcoming appt to establish care. Elevated fasting insulin, though this is improved over previous. Cholesterol panel looks good overall with mild elevation of the triglycerides. No anemia. Thyroid function studies, kidney function, liver function normal. A1c normal at 5%.

## 2024-10-28 ENCOUNTER — OFFICE VISIT (OUTPATIENT)
Dept: DIABETES | Facility: CLINIC | Age: 79
End: 2024-10-28
Payer: MEDICARE

## 2024-10-28 VITALS
WEIGHT: 177.5 LBS | DIASTOLIC BLOOD PRESSURE: 59 MMHG | HEART RATE: 77 BPM | SYSTOLIC BLOOD PRESSURE: 128 MMHG | HEIGHT: 61 IN | BODY MASS INDEX: 33.51 KG/M2

## 2024-10-28 DIAGNOSIS — E88.819 INSULIN RESISTANCE: Primary | ICD-10-CM

## 2024-10-28 DIAGNOSIS — E78.5 HYPERLIPIDEMIA, UNSPECIFIED HYPERLIPIDEMIA TYPE: ICD-10-CM

## 2024-10-28 DIAGNOSIS — E03.9 HYPOTHYROIDISM, UNSPECIFIED TYPE: ICD-10-CM

## 2024-10-28 DIAGNOSIS — I10 HYPERTENSION, UNSPECIFIED TYPE: ICD-10-CM

## 2024-10-28 PROCEDURE — 1101F PT FALLS ASSESS-DOCD LE1/YR: CPT | Mod: HCNC,CPTII,S$GLB, | Performed by: NURSE PRACTITIONER

## 2024-10-28 PROCEDURE — 1160F RVW MEDS BY RX/DR IN RCRD: CPT | Mod: HCNC,CPTII,S$GLB, | Performed by: NURSE PRACTITIONER

## 2024-10-28 PROCEDURE — G2211 COMPLEX E/M VISIT ADD ON: HCPCS | Mod: HCNC,S$GLB,, | Performed by: NURSE PRACTITIONER

## 2024-10-28 PROCEDURE — 3078F DIAST BP <80 MM HG: CPT | Mod: HCNC,CPTII,S$GLB, | Performed by: NURSE PRACTITIONER

## 2024-10-28 PROCEDURE — 99203 OFFICE O/P NEW LOW 30 MIN: CPT | Mod: HCNC,S$GLB,, | Performed by: NURSE PRACTITIONER

## 2024-10-28 PROCEDURE — 3074F SYST BP LT 130 MM HG: CPT | Mod: HCNC,CPTII,S$GLB, | Performed by: NURSE PRACTITIONER

## 2024-10-28 PROCEDURE — 1126F AMNT PAIN NOTED NONE PRSNT: CPT | Mod: HCNC,CPTII,S$GLB, | Performed by: NURSE PRACTITIONER

## 2024-10-28 PROCEDURE — 1159F MED LIST DOCD IN RCRD: CPT | Mod: HCNC,CPTII,S$GLB, | Performed by: NURSE PRACTITIONER

## 2024-10-28 PROCEDURE — 99999 PR PBB SHADOW E&M-EST. PATIENT-LVL IV: CPT | Mod: PBBFAC,HCNC,, | Performed by: NURSE PRACTITIONER

## 2024-10-28 PROCEDURE — 3288F FALL RISK ASSESSMENT DOCD: CPT | Mod: HCNC,CPTII,S$GLB, | Performed by: NURSE PRACTITIONER

## 2024-10-28 RX ORDER — METFORMIN HYDROCHLORIDE 500 MG/1
1000 TABLET, EXTENDED RELEASE ORAL 2 TIMES DAILY
Qty: 360 TABLET | Refills: 2 | Status: SHIPPED | OUTPATIENT
Start: 2024-10-28

## 2024-10-28 RX ORDER — LEVOTHYROXINE SODIUM 112 UG/1
112 TABLET ORAL
Qty: 90 TABLET | Refills: 2 | Status: SHIPPED | OUTPATIENT
Start: 2024-10-28

## 2025-01-30 ENCOUNTER — TELEPHONE (OUTPATIENT)
Dept: DIABETES | Facility: CLINIC | Age: 80
End: 2025-01-30
Payer: MEDICARE

## 2025-01-30 NOTE — TELEPHONE ENCOUNTER
----- Message from Summer sent at 1/30/2025  3:38 PM CST -----  Contact: Thelma  Ravi needs reschedule upcoming appointment due to the end of Feb. Please call patient at [phone] (home) . Thanks!

## 2025-01-31 ENCOUNTER — TELEPHONE (OUTPATIENT)
Dept: DIABETES | Facility: CLINIC | Age: 80
End: 2025-01-31
Payer: MEDICARE

## 2025-01-31 NOTE — TELEPHONE ENCOUNTER
Attempt to call patient cell number no answer left voice message to return my call and also called home number no answer

## 2025-01-31 NOTE — TELEPHONE ENCOUNTER
Informed patient that ms chirinos was leaving informed her that we can schedule her to see one of saud's partner pt denied

## 2025-01-31 NOTE — TELEPHONE ENCOUNTER
----- Message from Sharon sent at 1/31/2025 10:11 AM CST -----  Contact: Thelma  .Type:  Patient Returning Call    Who Called:thelma   Who Left Message for Patient:nurse   Does the patient know what this is regarding?:call back   Would the patient rather a call back or a response via [facility]? Call   Best Call Back Number:.[phone]   Additional Information: Pt requesting a call back

## 2025-01-31 NOTE — TELEPHONE ENCOUNTER
----- Message from Deb sent at 1/31/2025  9:47 AM CST -----  Contact: MARCELA GILBERT [[id number]]  .Type:  Patient Requesting Call    Who Called:MARCELA GILBERT [[id number]]  Does the patient know what this is regarding?:Patient returning a missed call to schedule an appointment  Would the patient rather a call back or a response via [facility]? Call back  Best Call Back Number:.[phone] ([place])    Additional Information:

## 2025-04-29 NOTE — PROGRESS NOTES
"Patient ID: Thelma Gonzalez is a 80 y.o. female.  Patient's current PCP is Antonina, Primary Doctor.   Collaborating Physician: LESVIA Pedroza MD    Chief Complaint: Diabetes Mellitus Consultation   HPI  Thelma Gonzalez is a 80 y.o. White female presenting for a new consult with me, previously seen by IMMANUEL Araiza for diabetes. Previously saw Dr. Adair for primary care - LOV 4/2024   Patient has been diagnosed with pre-diabetes and insulin resistance    Pertinent to decision making is/are the following comorbidities:  HLD, hypothyroidism, obesity, hx breast cancer   Complications related to diabetes: none  Recent diabetes related hospitalizations: none   Personal history of pancreatitis: denies  Family history of pancreatic cancer in first-degree relative: denies  Family history of MTC/MEN/endocrine tumors: denies     CURRENT DM MEDICATIONS:   Diabetes Medications              metFORMIN (GLUCOPHAGE-XR) 500 MG ER 24hr tablet Take 2 tablets (1,000 mg total) by mouth 2 (two) times a day.       Past failed treatment(s) include:   Ozempic -- not covered by insruance     Her blood sugar in the clinic today was: No results found for: "POCGLU"    Blood glucose testing none   Any episodes of hypoglycemia? No     Thelma Gonzalez presents to clinic today to discuss insulin resistance and hx pre-diabetes. She completed labs in October. A1c normal at 5.0%. Elevated insulin but improved. She is taking Metformin 1 tablet BID and tolerating well without SE.     Hypothyroidism -- On Synthroid 112 mcg daily. Labs in October WNL.      Vitamin D deficiency -- 325 mcg 3 per week. No recent lab.      HLD -- On Rosuvastatin and Fenofibrate. TG elevated on labs but LDL well-controlled. Followed by Dr. Dalal     Of note, Thelma Gonzalez is managed by the following specialists:   Oncology - Dr. Lux - hx breast CA //  Cards - Dr. Dalal   Ophthalmology -      Statin: Taking  ACE/ARB: Not taking    Labs reviewed and are noted " "below.    Screening or Prevention Patient's value Goal Complete/Controlled?   HgA1C Testing and Control   Lab Results   Component Value Date    HGBA1C 5.0 10/16/2024      Annually/Less than 8% Yes   Lipid profile : 01/08/2025 Annually Yes   LDL control Lab Results   Component Value Date    LDLCALC 62.8 (L) 10/16/2024    Annually/Less than 100 mg/dl  Yes   Nephropathy screening No results found for: "MICALBCREAT"  No results found for: "PROTEINUA" Annually No   Blood pressure BP Readings from Last 1 Encounters:   05/02/25 (!) 106/57    Less than 140/90 Yes   Dilated retinal exam Most Recent Eye Exam Date: Not Found Annually Yes    Foot exam   Most Recent Foot Exam Date: Not Found Annually Yes       Wt Readings from Last 3 Encounters:   05/02/25 1027 81.6 kg (179 lb 14.3 oz)   10/28/24 1339 80.5 kg (177 lb 7.5 oz)   04/02/24 1322 75.2 kg (165 lb 12.6 oz)         Lab Results   Component Value Date    T3FREE 3.2 08/16/2023    FREET4 1.07 10/16/2024    TSH 1.142 10/16/2024    CALCIUM 10.3 10/16/2024     No results found for: "CPEPTIDE"  No results found for: "GLUTAMICACID"  Glucose   Date Value Ref Range Status   10/16/2024 92 70 - 110 mg/dL Final     Anion Gap   Date Value Ref Range Status   10/16/2024 11 8 - 16 mmol/L Final           Review of patient's allergies indicates:   Allergen Reactions    Ciprofloxacin Hives    Nitrofurantoin monohyd/m-cryst Hives    Prednisone Hives    Macrolide antibiotics Hives     Social History[1]  Past Medical History:   Diagnosis Date    Cancer     Hyperlipidemia     Hyperthyroidism        Review of Systems   Constitutional:  Negative for diaphoresis, malaise/fatigue and weight loss.   Eyes:  Negative for blurred vision.   Respiratory:  Negative for shortness of breath.    Cardiovascular:  Negative for chest pain and leg swelling.   Gastrointestinal:  Negative for abdominal pain, constipation, diarrhea, heartburn, nausea and vomiting.   Genitourinary:  Negative for dysuria, frequency " and urgency.   Musculoskeletal:  Negative for falls.   Skin:  Negative for rash.   Neurological:  Negative for dizziness, weakness and headaches.   Endo/Heme/Allergies:  Negative for polydipsia.   Psychiatric/Behavioral:  The patient is not nervous/anxious.          Physical Exam  Constitutional:       Appearance: Normal appearance. She is obese.   HENT:      Head: Normocephalic and atraumatic.   Eyes:      Pupils: Pupils are equal, round, and reactive to light.   Cardiovascular:      Rate and Rhythm: Normal rate and regular rhythm.      Pulses: Normal pulses.      Heart sounds: Murmur heard.   Pulmonary:      Effort: Pulmonary effort is normal.      Breath sounds: Normal breath sounds.   Abdominal:      General: Bowel sounds are normal.      Palpations: Abdomen is soft.   Musculoskeletal:         General: Normal range of motion.      Cervical back: Neck supple.   Skin:     General: Skin is warm and dry.   Neurological:      General: No focal deficit present.      Mental Status: She is alert and oriented to person, place, and time.             Assessment and Plan:   Diagnoses and all orders for this visit:    Pre-diabetes  -     Hemoglobin A1C; Future    Insulin resistance  -     metFORMIN (GLUCOPHAGE-XR) 500 MG ER 24hr tablet; Take 2 tablets (1,000 mg total) by mouth 2 (two) times a day.    Obesity (BMI 30.0-34.9)    Mixed hyperlipidemia  -     Comprehensive Metabolic Panel; Future  -     CBC Auto Differential; Future    Hypothyroidism, unspecified type  -     T4, Free; Future  -     TSH; Future    Vitamin D deficiency  -     Vitamin D; Future    Vitamin B12 deficiency  -     Vitamin B12; Future    Does not have primary care provider  -     Ambulatory referral/consult to Internal Medicine; Future         Treatment plan for today's visit:   - Referral to establish care with PCP -- requested Dr. Connelly in [place]  - Labs today   - Continue Metformin   - Continue Synthroid at current dose 112 mcg - will adjust as  needed based on labs       Patient education:   - Carbohydrates: Limit to 30-45 grams with each meal. Never eat carbs by themselves - always add protein. Make snacks low carb or non-carb only.    - Blood sugar goals:   Fastin-130 mg/dl  2 hours after meal:  mg/dl  Before bed: 100-150 mg/dl    - Carry glucose tablets/soft peppermints/regular juice or Coke product with you at all times to treat a low blood sugar episode (less than 70).  If your blood sugar is between 50-70, Chew 4 tablets or drink 1/2 cup of juice or regular Coke product.   If your blood sugar is below 50, double the treatment.   Re-check blood sugar in 15 minutes. If still low, repeat this.   Always call the clinic to give an update for any low blood sugar episodes.    - Exercise: Goal is 150 minutes per week, which is about 30 minutes/day, 5 days/week. Start slowly and increase as tolerated.        Follow up: 6 months    Visit today included increased complexity associated with the care of the episodic problems addressed and managing the longitudinal care of the patient due to the serious and/or complex managed problem(s)       Lauren O. Landry, FNP-C [facility]          [1]   Social History  Socioeconomic History    Marital status:    Tobacco Use    Smoking status: Never    Smokeless tobacco: Never

## 2025-05-02 ENCOUNTER — LAB VISIT (OUTPATIENT)
Dept: LAB | Facility: HOSPITAL | Age: 80
End: 2025-05-02
Attending: NURSE PRACTITIONER
Payer: MEDICARE

## 2025-05-02 ENCOUNTER — OFFICE VISIT (OUTPATIENT)
Dept: DIABETES | Facility: CLINIC | Age: 80
End: 2025-05-02
Payer: MEDICARE

## 2025-05-02 VITALS
BODY MASS INDEX: 33.96 KG/M2 | SYSTOLIC BLOOD PRESSURE: 106 MMHG | WEIGHT: 179.88 LBS | HEIGHT: 61 IN | HEART RATE: 69 BPM | DIASTOLIC BLOOD PRESSURE: 57 MMHG

## 2025-05-02 DIAGNOSIS — E03.9 HYPOTHYROIDISM, UNSPECIFIED TYPE: ICD-10-CM

## 2025-05-02 DIAGNOSIS — E88.819 INSULIN RESISTANCE: ICD-10-CM

## 2025-05-02 DIAGNOSIS — E55.9 VITAMIN D DEFICIENCY: ICD-10-CM

## 2025-05-02 DIAGNOSIS — E53.8 VITAMIN B12 DEFICIENCY: ICD-10-CM

## 2025-05-02 DIAGNOSIS — E66.811 OBESITY (BMI 30.0-34.9): ICD-10-CM

## 2025-05-02 DIAGNOSIS — E78.2 MIXED HYPERLIPIDEMIA: ICD-10-CM

## 2025-05-02 DIAGNOSIS — R73.03 PRE-DIABETES: ICD-10-CM

## 2025-05-02 DIAGNOSIS — Z75.8 DOES NOT HAVE PRIMARY CARE PROVIDER: ICD-10-CM

## 2025-05-02 DIAGNOSIS — R73.03 PRE-DIABETES: Primary | ICD-10-CM

## 2025-05-02 LAB
ABSOLUTE EOSINOPHIL (OHS): 0.07 K/UL
ABSOLUTE MONOCYTE (OHS): 0.5 K/UL (ref 0.3–1)
ABSOLUTE NEUTROPHIL COUNT (OHS): 3.87 K/UL (ref 1.8–7.7)
ALBUMIN SERPL BCP-MCNC: 4.4 G/DL (ref 3.5–5.2)
ALP SERPL-CCNC: 50 UNIT/L (ref 40–150)
ALT SERPL W/O P-5'-P-CCNC: 29 UNIT/L (ref 10–44)
ANION GAP (OHS): 11 MMOL/L (ref 8–16)
AST SERPL-CCNC: 27 UNIT/L (ref 11–45)
BASOPHILS # BLD AUTO: 0.05 K/UL
BASOPHILS NFR BLD AUTO: 0.7 %
BILIRUB SERPL-MCNC: 0.4 MG/DL (ref 0.1–1)
BUN SERPL-MCNC: 23 MG/DL (ref 8–23)
CALCIUM SERPL-MCNC: 9.7 MG/DL (ref 8.7–10.5)
CHLORIDE SERPL-SCNC: 107 MMOL/L (ref 95–110)
CO2 SERPL-SCNC: 25 MMOL/L (ref 23–29)
CREAT SERPL-MCNC: 0.8 MG/DL (ref 0.5–1.4)
EAG (OHS): 105 MG/DL (ref 68–131)
ERYTHROCYTE [DISTWIDTH] IN BLOOD BY AUTOMATED COUNT: 13.2 % (ref 11.5–14.5)
GFR SERPLBLD CREATININE-BSD FMLA CKD-EPI: >60 ML/MIN/1.73/M2
GLUCOSE SERPL-MCNC: 83 MG/DL (ref 70–110)
HBA1C MFR BLD: 5.3 % (ref 4–5.6)
HCT VFR BLD AUTO: 43.5 % (ref 37–48.5)
HGB BLD-MCNC: 13.4 GM/DL (ref 12–16)
IMM GRANULOCYTES # BLD AUTO: 0.02 K/UL (ref 0–0.04)
IMM GRANULOCYTES NFR BLD AUTO: 0.3 % (ref 0–0.5)
LYMPHOCYTES # BLD AUTO: 2.96 K/UL (ref 1–4.8)
MCH RBC QN AUTO: 29.1 PG (ref 27–31)
MCHC RBC AUTO-ENTMCNC: 30.8 G/DL (ref 32–36)
MCV RBC AUTO: 94 FL (ref 82–98)
NUCLEATED RBC (/100WBC) (OHS): 0 /100 WBC
PLATELET # BLD AUTO: 282 K/UL (ref 150–450)
PMV BLD AUTO: 9.7 FL (ref 9.2–12.9)
POTASSIUM SERPL-SCNC: 4.3 MMOL/L (ref 3.5–5.1)
PROT SERPL-MCNC: 7.5 GM/DL (ref 6–8.4)
RBC # BLD AUTO: 4.61 M/UL (ref 4–5.4)
RELATIVE EOSINOPHIL (OHS): 0.9 %
RELATIVE LYMPHOCYTE (OHS): 39.6 % (ref 18–48)
RELATIVE MONOCYTE (OHS): 6.7 % (ref 4–15)
RELATIVE NEUTROPHIL (OHS): 51.8 % (ref 38–73)
SODIUM SERPL-SCNC: 143 MMOL/L (ref 136–145)
T4 FREE SERPL-MCNC: 1.1 NG/DL (ref 0.71–1.51)
TSH SERPL-ACNC: 0.48 UIU/ML (ref 0.4–4)
WBC # BLD AUTO: 7.47 K/UL (ref 3.9–12.7)

## 2025-05-02 PROCEDURE — 83036 HEMOGLOBIN GLYCOSYLATED A1C: CPT | Mod: HCNC

## 2025-05-02 PROCEDURE — 99999 PR PBB SHADOW E&M-EST. PATIENT-LVL IV: CPT | Mod: PBBFAC,HCNC,, | Performed by: NURSE PRACTITIONER

## 2025-05-02 PROCEDURE — 84450 TRANSFERASE (AST) (SGOT): CPT | Mod: HCNC

## 2025-05-02 PROCEDURE — 82607 VITAMIN B-12: CPT | Mod: HCNC

## 2025-05-02 PROCEDURE — 85025 COMPLETE CBC W/AUTO DIFF WBC: CPT | Mod: HCNC

## 2025-05-02 PROCEDURE — 84443 ASSAY THYROID STIM HORMONE: CPT | Mod: HCNC

## 2025-05-02 PROCEDURE — 36415 COLL VENOUS BLD VENIPUNCTURE: CPT | Mod: HCNC,PO

## 2025-05-02 PROCEDURE — 82306 VITAMIN D 25 HYDROXY: CPT | Mod: HCNC

## 2025-05-02 PROCEDURE — 84439 ASSAY OF FREE THYROXINE: CPT | Mod: HCNC

## 2025-05-02 RX ORDER — METFORMIN HYDROCHLORIDE 500 MG/1
1000 TABLET, EXTENDED RELEASE ORAL 2 TIMES DAILY
Qty: 360 TABLET | Refills: 3 | Status: SHIPPED | OUTPATIENT
Start: 2025-05-02

## 2025-05-02 NOTE — PATIENT INSTRUCTIONS
Medications adjusted for today's visit include:   Continue metformin     Labs today -- synthroid dose pending labs     Patient education:     Carbohydrates: Limit to 30-45 grams with each meal. Never eat carbs by themselves - always add protein. Make snacks low carb or non-carb only.    Blood sugar goals:   Fastin-130 mg/dl  2 hours after meal:  mg/dl  Before bed: 100-150 mg/dl    Carry glucose tablets/soft peppermints/regular juice or Coke product with you at all times to treat a low blood sugar episode (less than 70).  If your blood sugar is between 50-70, Chew 4 tablets or drink 1/2 cup of juice or regular Coke product.   If your blood sugar is below 50, double the treatment.   Re-check blood sugar in 15 minutes. If still low, repeat this.   Always call the clinic to give an update for any low blood sugar episodes.    Exercise: Goal is 150 minutes per week, which is about 30 minutes/day, 5 days/week. Start slowly and increase as tolerated.

## 2025-05-03 LAB
25(OH)D3+25(OH)D2 SERPL-MCNC: 64 NG/ML (ref 30–96)
VIT B12 SERPL-MCNC: 338 PG/ML (ref 210–950)

## 2025-05-05 ENCOUNTER — TELEPHONE (OUTPATIENT)
Dept: DIABETES | Facility: CLINIC | Age: 80
End: 2025-05-05
Payer: MEDICARE

## 2025-05-05 ENCOUNTER — RESULTS FOLLOW-UP (OUTPATIENT)
Dept: DIABETES | Facility: CLINIC | Age: 80
End: 2025-05-05

## 2025-05-05 DIAGNOSIS — E03.9 HYPOTHYROIDISM, UNSPECIFIED TYPE: ICD-10-CM

## 2025-05-05 RX ORDER — LEVOTHYROXINE SODIUM 112 UG/1
112 TABLET ORAL
Qty: 90 TABLET | Refills: 3 | Status: SHIPPED | OUTPATIENT
Start: 2025-05-05 | End: 2025-05-08 | Stop reason: SDUPTHER

## 2025-05-05 NOTE — TELEPHONE ENCOUNTER
----- Message from Lorenza Luke NP sent at 5/5/2025  7:55 AM CDT -----  Labs reviewed --     All labs returned normal.   Thyroid function looks good on current dose of Synthroid - Will send refills of her Synthroid in now.     A message was sent to the primary care team in [place] to get her set up with Dr. Connelly.     Thanks,     [facility]   [facility]     ----- Message -----  From: Lab, Background User  Sent: 5/2/2025   9:50 PM CDT  To: Lorenza Luke NP

## 2025-05-08 ENCOUNTER — PATIENT MESSAGE (OUTPATIENT)
Dept: DIABETES | Facility: CLINIC | Age: 80
End: 2025-05-08
Payer: MEDICARE

## 2025-05-08 DIAGNOSIS — E03.9 HYPOTHYROIDISM, UNSPECIFIED TYPE: Primary | ICD-10-CM

## 2025-05-08 DIAGNOSIS — E03.9 HYPOTHYROIDISM, UNSPECIFIED TYPE: ICD-10-CM

## 2025-05-08 RX ORDER — LEVOTHYROXINE SODIUM 112 UG/1
112 TABLET ORAL
Qty: 90 TABLET | Refills: 3 | Status: SHIPPED | OUTPATIENT
Start: 2025-05-08 | End: 2025-05-08 | Stop reason: SDUPTHER

## 2025-05-08 RX ORDER — LEVOTHYROXINE SODIUM 112 UG/1
112 TABLET ORAL
Qty: 90 TABLET | Refills: 3 | Status: SHIPPED | OUTPATIENT
Start: 2025-05-08 | End: 2025-08-06